# Patient Record
Sex: MALE | Race: WHITE | Employment: FULL TIME | URBAN - METROPOLITAN AREA
[De-identification: names, ages, dates, MRNs, and addresses within clinical notes are randomized per-mention and may not be internally consistent; named-entity substitution may affect disease eponyms.]

---

## 2018-02-12 ENCOUNTER — OFFICE VISIT (OUTPATIENT)
Dept: PEDIATRICS | Facility: CLINIC | Age: 33
End: 2018-02-12
Payer: COMMERCIAL

## 2018-02-12 VITALS
DIASTOLIC BLOOD PRESSURE: 66 MMHG | WEIGHT: 177.5 LBS | HEART RATE: 71 BPM | BODY MASS INDEX: 26.29 KG/M2 | TEMPERATURE: 97.9 F | OXYGEN SATURATION: 96 % | SYSTOLIC BLOOD PRESSURE: 116 MMHG | HEIGHT: 69 IN

## 2018-02-12 DIAGNOSIS — R10.84 ABDOMINAL PAIN, GENERALIZED: ICD-10-CM

## 2018-02-12 DIAGNOSIS — E73.9 LACTOSE INTOLERANCE IN ADULT: Primary | ICD-10-CM

## 2018-02-12 LAB
ALBUMIN SERPL-MCNC: 3.6 G/DL (ref 3.4–5)
ALP SERPL-CCNC: 71 U/L (ref 40–150)
ALT SERPL W P-5'-P-CCNC: 41 U/L (ref 0–70)
ANION GAP SERPL CALCULATED.3IONS-SCNC: 5 MMOL/L (ref 3–14)
AST SERPL W P-5'-P-CCNC: 22 U/L (ref 0–45)
BASOPHILS # BLD AUTO: 0.1 10E9/L (ref 0–0.2)
BASOPHILS NFR BLD AUTO: 0.9 %
BILIRUB SERPL-MCNC: 0.9 MG/DL (ref 0.2–1.3)
BUN SERPL-MCNC: 29 MG/DL (ref 7–30)
CALCIUM SERPL-MCNC: 9 MG/DL (ref 8.5–10.1)
CHLORIDE SERPL-SCNC: 106 MMOL/L (ref 94–109)
CO2 SERPL-SCNC: 31 MMOL/L (ref 20–32)
CREAT SERPL-MCNC: 1.28 MG/DL (ref 0.66–1.25)
DIFFERENTIAL METHOD BLD: NORMAL
EOSINOPHIL # BLD AUTO: 0.2 10E9/L (ref 0–0.7)
EOSINOPHIL NFR BLD AUTO: 3.7 %
ERYTHROCYTE [DISTWIDTH] IN BLOOD BY AUTOMATED COUNT: 13.1 % (ref 10–15)
GFR SERPL CREATININE-BSD FRML MDRD: 64 ML/MIN/1.7M2
GLUCOSE SERPL-MCNC: 74 MG/DL (ref 70–99)
HCT VFR BLD AUTO: 45.5 % (ref 40–53)
HGB BLD-MCNC: 15.2 G/DL (ref 13.3–17.7)
IMM GRANULOCYTES # BLD: 0 10E9/L (ref 0–0.4)
IMM GRANULOCYTES NFR BLD: 0.7 %
LYMPHOCYTES # BLD AUTO: 1.3 10E9/L (ref 0.8–5.3)
LYMPHOCYTES NFR BLD AUTO: 23.8 %
MCH RBC QN AUTO: 28.8 PG (ref 26.5–33)
MCHC RBC AUTO-ENTMCNC: 33.4 G/DL (ref 31.5–36.5)
MCV RBC AUTO: 86 FL (ref 78–100)
MONOCYTES # BLD AUTO: 0.6 10E9/L (ref 0–1.3)
MONOCYTES NFR BLD AUTO: 10.3 %
NEUTROPHILS # BLD AUTO: 3.4 10E9/L (ref 1.6–8.3)
NEUTROPHILS NFR BLD AUTO: 60.6 %
PLATELET # BLD AUTO: 247 10E9/L (ref 150–450)
POTASSIUM SERPL-SCNC: 3.9 MMOL/L (ref 3.4–5.3)
PROT SERPL-MCNC: 6.6 G/DL (ref 6.8–8.8)
RBC # BLD AUTO: 5.27 10E12/L (ref 4.4–5.9)
SODIUM SERPL-SCNC: 142 MMOL/L (ref 133–144)
WBC # BLD AUTO: 5.6 10E9/L (ref 4–11)

## 2018-02-12 PROCEDURE — 80053 COMPREHEN METABOLIC PANEL: CPT | Performed by: FAMILY MEDICINE

## 2018-02-12 PROCEDURE — 36415 COLL VENOUS BLD VENIPUNCTURE: CPT | Performed by: FAMILY MEDICINE

## 2018-02-12 PROCEDURE — 83516 IMMUNOASSAY NONANTIBODY: CPT | Performed by: FAMILY MEDICINE

## 2018-02-12 PROCEDURE — 83516 IMMUNOASSAY NONANTIBODY: CPT | Mod: 59 | Performed by: FAMILY MEDICINE

## 2018-02-12 PROCEDURE — 99203 OFFICE O/P NEW LOW 30 MIN: CPT | Performed by: FAMILY MEDICINE

## 2018-02-12 PROCEDURE — 86003 ALLG SPEC IGE CRUDE XTRC EA: CPT | Mod: 59 | Performed by: FAMILY MEDICINE

## 2018-02-12 PROCEDURE — 85025 COMPLETE CBC W/AUTO DIFF WBC: CPT | Performed by: FAMILY MEDICINE

## 2018-02-12 PROCEDURE — 86008 ALLG SPEC IGE RECOMB EA: CPT | Performed by: FAMILY MEDICINE

## 2018-02-12 RX ORDER — AMLODIPINE BESYLATE 5 MG/1
5 TABLET ORAL DAILY
COMMUNITY
Start: 2017-10-04

## 2018-02-12 RX ORDER — LISINOPRIL/HYDROCHLOROTHIAZIDE 10-12.5 MG
1 TABLET ORAL DAILY
COMMUNITY
Start: 2017-10-04 | End: 2018-02-12

## 2018-02-12 ASSESSMENT — PAIN SCALES - GENERAL: PAINLEVEL: MILD PAIN (3)

## 2018-02-12 NOTE — MR AVS SNAPSHOT
"              After Visit Summary   2/12/2018    Adrian Garcia    MRN: [id number]           Patient Information     Date Of Birth          1985        Visit Information        Provider Department      2/12/2018 8:10 AM Wolf Encinas MD [facility]        Today's Diagnoses     Lactose intolerance in adult    -  1    Abdominal pain, generalized          Care Instructions    Get the labs today    Lactose Intolerance    Lactose is a simple sugar found in milk and dairy products. Lactose is found in dairy products such as milk, cheese, cottage cheese, cream, sour cream, ice cream, sherbet, milk solids, powdered milk, and whey.  Lactose is digested with the help of an enzyme the body makes called  lactase.\" People with lactose intolerance cannot digest dairy products. This is because their bodies do not make enough lactase. Undigested lactose in the gut cannot be absorbed. This can cause nausea, cramping, bloating, gas, diarrhea, and foul-smelling stools. These symptoms occur within 30 minutes to 2 hours after eating. Symptoms may be mild or severe.  Babies are born with the lactose enzyme, which allows them to absorb breast milk. However, lactose intolerance may appear in children as early as 2 to 5 years old. Even if you have been able to eat dairy products all your life, your body may lose the ability to make the lactose enzyme as you get older. Asians,  Americans, Hispanics, and American Indians are prone to get this problem earlier in life.  Home care  The following guidelines will help you care for yourself at home:    Your body doesn t need dairy products to be healthy. So, if your symptoms are severe, it is best to stop eating dairy products that contain lactose.    If your symptoms are milder, you can probably do well consuming smaller amounts of dairy as long as you combine it with nondairy foods. Yogurt with live cultures may be OK because it contains enzymes that digest " lactose. Butter, margarine, hard and aged cheeses (cheddar, Swiss) contain less lactose and may be OK to eat. You will have to experiment to learn how much dairy you can tolerate without getting symptoms. It may help to keep a food diary.    There are many lactose-free dairy products including milk, ice cream, and cheeses that allow you to still enjoy dairy products. You may also take a lactase enzyme as a supplement that will help you digest dairy products.    We all need calcium and vitamin D in our diet for healthy bones. If you reduce or eliminate dairy from your diet, you need to replace it with other food sources that contain these nutrients such as kale, broccoli, white beans, green beans, lima beans, salmon, soy beans, tofu, or fortified juices. Or, you can take daily supplements.    Learn to read food labels. Also, watch for prepared foods that are made with products that contain lactose (such as bread, cereal, lunch meats, salad dressings, cake and cookie mix, and coffee creamers). However, many people can consume small amounts of lactose without symptoms, so an overly restrictive diet is often not needed.    Lactase enzyme supplements can be obtained over-the-counter. They can help control symptoms if you take the enzymes when you eat lactose.  Other products besides milk and milk products may contain lactose. They may be less obvious, so check the labels carefully. These things may not bother you, but should be considered if you continue to have problems:    Bread and other baked goods    Waffles, pancakes, biscuits, cookies, and mixes to make them    Processed breakfast foods such as doughnuts, frozen waffles and pancakes, toaster pastries, and sweet rolls    Processed breakfast cereals    Instant potatoes, soups, and breakfast drinks    Potato chips, corn chips, and other processed snacks    Processed meats like kaur, sausage, hot dogs, and lunch meats    Margarine    Salad dressings    Liquid and  powdered milk-based meal replacements    Protein powders and bars    Candies    Nondairy liquid and powdered coffee creamers    Nondairy whipped toppings  Follow-up care  Follow up with your healthcare provider, or as advised.  When to seek medical advice  Call your healthcare provider right away if any of these occur:    Increasing abdominal pain or swelling    Abdominal pain that moves to the right side of the lower abdomen    Fever of 100.4 F (38 C) or higher, or as directed by your healthcare provider    Repeated vomiting or diarrhea    Blood in the stool or black and tarry stool (depending on the amount of blood, consider calling 911 for emergency assistance)  Date Last Reviewed: 10/1/2016    3884-6247 [employer]. [address] [place] [address], [place] [address]. All rights reserved. This information is not intended as a substitute for professional medical care. Always follow your healthcare professional's instructions.        Tips for Lactose Intolerance    If you are lactose intolerant, you have trouble digesting lactose. Lactose is a sugar found in milk and other dairy products. Many people are lactose intolerant. Undigested lactose won t hurt you, but it can cause unpleasant symptoms. The good news is that you can get relief. To help reduce symptoms, look for ways to limit the amount of lactose you eat. You can also take a lactase supplement before you eat dairy products.  Finding your limit  People with lactose intolerance may think they can t eat or drink any dairy products. This is often not true. Many people with lactose intolerance can eat or drink small amounts of dairy products without symptoms. To find your own limit, keep track of what you eat and drink. Write down when you have symptoms. Learn how much and what kinds of dairy products you can handle.  Tips to reduce symptoms    Choose low-lactose or lactose-free dairy products. These are widely available. They include products like milk,  yogurt, cheese, and ice cream, among others.    Eat foods with active cultures, such as yogurt. Active cultures make lactose easier to digest.    Eat or drink dairy products with other foods to lessen symptoms.    Use fruit juice to replace some or all of the milk in recipes.    Take lactase enzyme tablets with dairy products to help prevent symptoms.    Avoid eating many high-lactose foods (such as milk, butter, and ice cream) at one time.  Eat other calcium-rich foods  If you eat less dairy, you may be getting less calcium. Ask your doctor about calcium supplements. Also, eat more dairy-free, calcium-rich foods, such as:    Broccoli, lettuce greens, kale, bok ned (Chinese cabbage), turnip greens    Fish with edible bones (canned salmon or sardines)    Alfalfa sprouts, soy sprouts    Tofu, soybeans, morton beans, navy beans    Almonds, sesame seeds    Calcium-fortified orange juice, soy drink, rice drink    Oranges  Be aware that the calcium from these foods varies. It may not be as well absorbed by the body as calcium from dairy products.   Try nondairy substitutes  Dairy Substitute   Milk, cream Soy drink, rice drink, nondairy creamer   Cheese Tofu (soy) cheese, some aged cheeses   Butter, margarine Milk-free margarine, vegetable oil   Ice cream Fruit sorbet, juice bars      Your body needs vitamin D to use calcium. You can get vitamin D by eating foods that have vitamin D. These include salmon, tuna, and eggs. Also, talk with your provider about taking a vitamin D supplement. Your vitamin D levels can be checked and followed by a blood test to be sure you are not lacking this nutrient.  Removing all dairy items from your diet is not often needed. And removing dairy also means taking out other healthy foods from your diet. This is why lactose-free dairy products are often a good choice. Your provider can also talk with you about taking calcium and vitamin D supplements.  Date Last Reviewed: 7/1/2016 2000-2017  [employer]. [address] [place] [address]. All rights reserved. This information is not intended as a substitute for professional medical care. Always follow your healthcare professional's instructions.                Follow-ups after your visit        Who to contact     If you have questions or need follow up information about today's clinic visit or your schedule please contact [facility] directly at [phone].  Normal or non-critical lab and imaging results will be communicated to you by [employer], letter or phone within 4 business days after the clinic has received the results. If you do not hear from us within 7 days, please contact the clinic through [employer] or phone. If you have a critical or abnormal lab result, we will notify you by phone as soon as possible.  Submit refill requests through [employer] or call your pharmacy and they will forward the refill request to us. Please allow 3 business days for your refill to be completed.          Additional Information About Your Visit        [employer] Information     [employer] is an electronic gateway that provides easy, online access to your medical records. With [employer], you can request a clinic appointment, read your test results, renew a prescription or communicate with your care team.     To sign up for [employer] visit the website at [email or web address]   You will be asked to enter the access code listed below, as well as some personal information. Please follow the directions to create your username and password.     Your access code is: [email or web address]  Expires: 2018  8:44 AM     Your access code will  in 90 days. If you need help or a new code, please contact your [facility] or call [phone] for assistance.        Care EveryWhere ID     This is your Care EveryWhere ID. This could be used by other organizations to access your [place] medical records  [id number]       "  Your Vitals Were     Pulse Temperature Height Pulse Oximetry BMI (Body Mass Index)       71 97.9  F (36.6  C) (Oral) 5' 8.5\" (1.74 m) 96% 26.6 kg/m2        Blood Pressure from Last 3 Encounters:   02/12/18 116/66   02/05/15 106/68   12/16/14 122/78    Weight from Last 3 Encounters:   02/12/18 177 lb 8 oz (80.5 kg)   02/05/15 177 lb (80.3 kg)   12/16/14 179 lb 9.6 oz (81.5 kg)              We Performed the Following     Allergy adult food panel     CBC with platelets and differential     Comprehensive metabolic panel     Milk Components Allergy Panel     Tissue transglutaminase arun IgA and IgG        Primary Care Provider Fax #    Physician No Ref-Primary [phone]       No address on file        Equal Access to Services     VERONICA VELAZQUEZ : Tan Ibrahim, wajuan luqjemima, jaz kaalmada reji, shivani felix . So [facility] [phone].    ATENCIÓN: Si habla español, tiene a pemberton disposición servicios gratuitos de asistencia lingüística. Alex al [phone].    We comply with applicable federal civil rights laws and [place] laws. We do not discriminate on the basis of race, color, national origin, age, disability, sex, sexual orientation, or gender identity.            Thank you!     Thank you for choosing [facility]  for your care. Our goal is always to provide you with excellent care. Hearing back from our patients is one way we can continue to improve our services. Please take a few minutes to complete the written survey that you may receive in the mail after your visit with us. Thank you!             Your Updated Medication List - Protect others around you: Learn how to safely use, store and throw away your medicines at www.disposemymeds.org.          This list is accurate as of 2/12/18  8:44 AM.  Always use your most recent med list.                   Brand Name Dispense Instructions for use Diagnosis    amLODIPine 5 MG tablet    NORVASC     Take " 5 mg by mouth daily    Lactose intolerance in adult, Abdominal pain, generalized       lisinopril-hydrochlorothiazide 10-12.5 MG per tablet    PRINZIDE/ZESTORETIC     Take 1 tablet by mouth daily    Lactose intolerance in adult, Abdominal pain, generalized

## 2018-02-12 NOTE — PATIENT INSTRUCTIONS
"Get the labs today    Lactose Intolerance    Lactose is a simple sugar found in milk and dairy products. Lactose is found in dairy products such as milk, cheese, cottage cheese, cream, sour cream, ice cream, sherbet, milk solids, powdered milk, and whey.  Lactose is digested with the help of an enzyme the body makes called  lactase.\" People with lactose intolerance cannot digest dairy products. This is because their bodies do not make enough lactase. Undigested lactose in the gut cannot be absorbed. This can cause nausea, cramping, bloating, gas, diarrhea, and foul-smelling stools. These symptoms occur within 30 minutes to 2 hours after eating. Symptoms may be mild or severe.  Babies are born with the lactose enzyme, which allows them to absorb breast milk. However, lactose intolerance may appear in children as early as 2 to 5 years old. Even if you have been able to eat dairy products all your life, your body may lose the ability to make the lactose enzyme as you get older. Asians,  Americans, Hispanics, and American Indians are prone to get this problem earlier in life.  Home care  The following guidelines will help you care for yourself at home:    Your body doesn t need dairy products to be healthy. So, if your symptoms are severe, it is best to stop eating dairy products that contain lactose.    If your symptoms are milder, you can probably do well consuming smaller amounts of dairy as long as you combine it with nondairy foods. Yogurt with live cultures may be OK because it contains enzymes that digest lactose. Butter, margarine, hard and aged cheeses (cheddar, Swiss) contain less lactose and may be OK to eat. You will have to experiment to learn how much dairy you can tolerate without getting symptoms. It may help to keep a food diary.    There are many lactose-free dairy products including milk, ice cream, and cheeses that allow you to still enjoy dairy products. You may also take a lactase enzyme as " a supplement that will help you digest dairy products.    We all need calcium and vitamin D in our diet for healthy bones. If you reduce or eliminate dairy from your diet, you need to replace it with other food sources that contain these nutrients such as kale, broccoli, white beans, green beans, lima beans, salmon, soy beans, tofu, or fortified juices. Or, you can take daily supplements.    Learn to read food labels. Also, watch for prepared foods that are made with products that contain lactose (such as bread, cereal, lunch meats, salad dressings, cake and cookie mix, and coffee creamers). However, many people can consume small amounts of lactose without symptoms, so an overly restrictive diet is often not needed.    Lactase enzyme supplements can be obtained over-the-counter. They can help control symptoms if you take the enzymes when you eat lactose.  Other products besides milk and milk products may contain lactose. They may be less obvious, so check the labels carefully. These things may not bother you, but should be considered if you continue to have problems:    Bread and other baked goods    Waffles, pancakes, biscuits, cookies, and mixes to make them    Processed breakfast foods such as doughnuts, frozen waffles and pancakes, toaster pastries, and sweet rolls    Processed breakfast cereals    Instant potatoes, soups, and breakfast drinks    Potato chips, corn chips, and other processed snacks    Processed meats like kaur, sausage, hot dogs, and lunch meats    Margarine    Salad dressings    Liquid and powdered milk-based meal replacements    Protein powders and bars    Candies    Nondairy liquid and powdered coffee creamers    Nondairy whipped toppings  Follow-up care  Follow up with your healthcare provider, or as advised.  When to seek medical advice  Call your healthcare provider right away if any of these occur:    Increasing abdominal pain or swelling    Abdominal pain that moves to the right side of  the lower abdomen    Fever of 100.4 F (38 C) or higher, or as directed by your healthcare provider    Repeated vomiting or diarrhea    Blood in the stool or black and tarry stool (depending on the amount of blood, consider calling 911 for emergency assistance)  Date Last Reviewed: 10/1/2016    5509-0177 [employer]. [address] [place] [address], [place] [address]. All rights reserved. This information is not intended as a substitute for professional medical care. Always follow your healthcare professional's instructions.        Tips for Lactose Intolerance    If you are lactose intolerant, you have trouble digesting lactose. Lactose is a sugar found in milk and other dairy products. Many people are lactose intolerant. Undigested lactose won t hurt you, but it can cause unpleasant symptoms. The good news is that you can get relief. To help reduce symptoms, look for ways to limit the amount of lactose you eat. You can also take a lactase supplement before you eat dairy products.  Finding your limit  People with lactose intolerance may think they can t eat or drink any dairy products. This is often not true. Many people with lactose intolerance can eat or drink small amounts of dairy products without symptoms. To find your own limit, keep track of what you eat and drink. Write down when you have symptoms. Learn how much and what kinds of dairy products you can handle.  Tips to reduce symptoms    Choose low-lactose or lactose-free dairy products. These are widely available. They include products like milk, yogurt, cheese, and ice cream, among others.    Eat foods with active cultures, such as yogurt. Active cultures make lactose easier to digest.    Eat or drink dairy products with other foods to lessen symptoms.    Use fruit juice to replace some or all of the milk in recipes.    Take lactase enzyme tablets with dairy products to help prevent symptoms.    Avoid eating many high-lactose foods (such as milk,  butter, and ice cream) at one time.  Eat other calcium-rich foods  If you eat less dairy, you may be getting less calcium. Ask your doctor about calcium supplements. Also, eat more dairy-free, calcium-rich foods, such as:    Broccoli, lettuce greens, kale, bok ned (Chinese cabbage), turnip greens    Fish with edible bones (canned salmon or sardines)    Alfalfa sprouts, soy sprouts    Tofu, soybeans, morton beans, navy beans    Almonds, sesame seeds    Calcium-fortified orange juice, soy drink, rice drink    Oranges  Be aware that the calcium from these foods varies. It may not be as well absorbed by the body as calcium from dairy products.   Try nondairy substitutes  Dairy Substitute   Milk, cream Soy drink, rice drink, nondairy creamer   Cheese Tofu (soy) cheese, some aged cheeses   Butter, margarine Milk-free margarine, vegetable oil   Ice cream Fruit sorbet, juice bars      Your body needs vitamin D to use calcium. You can get vitamin D by eating foods that have vitamin D. These include salmon, tuna, and eggs. Also, talk with your provider about taking a vitamin D supplement. Your vitamin D levels can be checked and followed by a blood test to be sure you are not lacking this nutrient.  Removing all dairy items from your diet is not often needed. And removing dairy also means taking out other healthy foods from your diet. This is why lactose-free dairy products are often a good choice. Your provider can also talk with you about taking calcium and vitamin D supplements.  Date Last Reviewed: 7/1/2016 2000-2017 [employer]. [address] [place] [address], [place] [address]. All rights reserved. This information is not intended as a substitute for professional medical care. Always follow your healthcare professional's instructions.

## 2018-02-12 NOTE — PROGRESS NOTES
SUBJECTIVE:   Adrian Garcia is a 32 year old male who presents to clinic today for the following health issues:        This is a new patient to this provider, is here today with concerns of having abdominal bloating, discomfort that starts right after he eats milk or milk products-for the past 3-4 months.    Patient denies having lactose intolerance in the past.    He denies concerns for current constipation, diarrhea, UTI symptoms.  Patient had diarrhea when he had lactose intolerance.    The last time he had any milk product was 3 days ago.  Patient denies personal or family history of gluten intolerance.  He is planning for [employer] trip with his family and wants to have this checked out before he leaves since he is worried about having milk products and almost every food that he may be eating there.  Patient denies alcohol use, previous liver or gallbladder problems.    ABDOMINAL PAIN     Onset: 3-4 months    Description:   Character: Fullness and pressure  Location: lower abdomen - both sides  Radiation: None    Intensity: moderate    Progression of Symptoms:  same    Accompanying Signs & Symptoms:  Fever/Chills?: no   Gas/Bloating: YES  Nausea: no   Vomitting: no   Diarrhea?: YES  Constipation:no   Dysuria or Hematuria: no    History:   Trauma: no   Previous similar pain: YES- similar symptoms in early 20's for a few years and went away on it's own   Previous tests done: none    Precipitating factors:   Does the pain change with:     Food: YES- only has symptoms after eating dairy products     BM: no     Urination: no     Alleviating factors:  Avoiding dairy products    Therapies Tried and outcome: Lactaid-not effective    LMP:  not applicable           Problem list and histories reviewed & adjusted, as indicated.  Additional history: as documented    Patient Active Problem List   Diagnosis     Memory loss     Dizziness     Benign essential hypertension     Sinusitis     CARDIOVASCULAR SCREENING; LDL  "GOAL LESS THAN 160     History reviewed. No pertinent surgical history.    Social History   Substance Use Topics     Smoking status: Never Smoker     Smokeless tobacco: Never Used     Alcohol use No     History reviewed. No pertinent family history.      Current Outpatient Prescriptions   Medication Sig Dispense Refill     lisinopril-hydrochlorothiazide (PRINZIDE/ZESTORETIC) 10-12.5 MG per tablet Take 1 tablet by mouth daily       amLODIPine (NORVASC) 5 MG tablet Take 5 mg by mouth daily       [DISCONTINUED] AmLODIPine Besylate (NORVASC PO) Take 1 tablet by mouth daily       Allergies   Allergen Reactions     Codeine      Codeine Sulfate Nausea and Vomiting     Recent Labs   Lab Test  02/12/18   0850   ALT  41   CR  1.28*   GFRESTIMATED  64   GFRESTBLACK  78   POTASSIUM  3.9      BP Readings from Last 3 Encounters:   02/12/18 116/66   02/05/15 106/68   12/16/14 122/78    Wt Readings from Last 3 Encounters:   02/12/18 177 lb 8 oz (80.5 kg)   02/05/15 177 lb (80.3 kg)   12/16/14 179 lb 9.6 oz (81.5 kg)                  Labs reviewed in EPIC    Reviewed and updated as needed this visit by clinical staff  Tobacco  Allergies  Meds  Med Hx  Surg Hx  Fam Hx  Soc Hx      Reviewed and updated as needed this visit by Provider         ROS:  C: NEGATIVE for fever, chills, change in weight  R: NEGATIVE for significant cough or SOB  CV: NEGATIVE for chest pain, palpitations or peripheral edema  GI: as above  : negative for dysuria, hematuria, decreased urinary stream, erectile dysfunction  PSYCHIATRIC: NEGATIVE for changes in mood or affect    OBJECTIVE:     /66 (BP Location: Right arm, Patient Position: Sitting, Cuff Size: Adult Regular)  Pulse 71  Temp 97.9  F (36.6  C) (Oral)  Ht 5' 8.5\" (1.74 m)  Wt 177 lb 8 oz (80.5 kg)  SpO2 96%  BMI 26.6 kg/m2  Body mass index is 26.6 kg/(m^2).  GENERAL: healthy, alert and no distress  RESP: lungs clear to auscultation - no rales, rhonchi or wheezes  CV: regular rate " and rhythm, normal S1 S2, no S3 or S4, no murmur, click or rub, no peripheral edema and peripheral pulses strong  ABDOMEN: soft, non tender, no guarding or rigidity, no organomegaly, normal BS, no costovertebral angle tenderness  PSYCH: mentation appears normal, affect normal/bright    Diagnostic Test Results:  none     ASSESSMENT/PLAN:             1. Abdominal pain, generalized  ddx given the history-lactose intolerance/-other food allergies/constipation  Recommended to have labs done for further evaluation.  Will f/u on results and call with recommendations.  If lab tests are negative, consider treating constipation with over-the-counter MiraLAX presumptively.  Continue to avoid triggering foods    Patient verbalised understanding and is agreeable to the plan.      - CBC with platelets and differential  - Comprehensive metabolic panel  - Allergy adult food panel  - Tissue transglutaminase arun IgA and IgG  - Milk Components Allergy Panel    2. Lactose intolerance in adult  as above    - CBC with platelets and differential  - Comprehensive metabolic panel  - Allergy adult food panel  - Tissue transglutaminase arun IgA and IgG  - Milk Components Allergy Panel    See Patient Instructions.  Chart documentation done in part with Dragon Voice recognition Software. Although reviewed after completion, some word and grammatical error may remain.        Wolf Encinas MD  [facility]

## 2018-02-12 NOTE — NURSING NOTE
"Chief Complaint   Patient presents with     Abdominal Pain       Initial /66 (BP Location: Right arm, Patient Position: Sitting, Cuff Size: Adult Regular)  Pulse 71  Temp 97.9  F (36.6  C) (Oral)  Ht 5' 8.5\" (1.74 m)  Wt 177 lb 8 oz (80.5 kg)  SpO2 96%  BMI 26.6 kg/m2 Estimated body mass index is 26.6 kg/(m^2) as calculated from the following:    Height as of this encounter: 5' 8.5\" (1.74 m).    Weight as of this encounter: 177 lb 8 oz (80.5 kg).  Medication Reconciliation: complete  Asia Motley, TENISHA    "

## 2018-02-14 ENCOUNTER — TELEPHONE (OUTPATIENT)
Dept: PEDIATRICS | Facility: CLINIC | Age: 33
End: 2018-02-14

## 2018-02-14 PROBLEM — R79.89 ELEVATED SERUM CREATININE: Status: ACTIVE | Noted: 2018-02-14

## 2018-02-14 LAB
A-LACTALB IGE QN: <0.1 KU(A)/L
B-LACTOGLOB MF77 IGE QN: <0.1 KU(A)/L
CASEIN IGE QN: <0.1 KU(A)/L
CLAM IGE QN: <0.1 KU(A)/L
CODFISH IGE QN: <0.1 KU(A)/L
CORN IGE QN: <0.1 KU(A)/L
COW MILK IGE QN: <0.1 KU/L
EGG WHITE IGE QN: <0.1 KU(A)/L
PEANUT IGE QN: <0.1 KU(A)/L
SCALLOP IGE QN: <0.1 KU(A)/L
SHRIMP IGE QN: <0.1 KU(A)/L
SOYBEAN IGE QN: <0.1 KU(A)/L
TTG IGA SER-ACNC: <1 U/ML
TTG IGG SER-ACNC: <1 U/ML
WALNUT IGE QN: <0.1 KU(A)/L
WHEAT IGE QN: <0.1 KU(A)/L

## 2018-02-14 NOTE — TELEPHONE ENCOUNTER
Please inform Adrian of lab results-no food allergies found including milk.  Normal liver functions, very slightly elevated creatinine which could be nonspecific (we will recheck these labs at the time of physical in the next few months.) .    Will give a trial of daily MiraLAX For the next 1-2 weeks and then twice weekly for maintenance.   if this does not resolve the pain in 2 weeks, will follow up for reevaluation.

## 2018-02-15 NOTE — TELEPHONE ENCOUNTER
Attempt #1    Left message for patient to return call to clinic.  Clinic phone number given.    Analilia Mendosa CMA

## 2018-02-15 NOTE — TELEPHONE ENCOUNTER
I advised patient of the information below per Dr. Encinas.  Patient states he has not had any constipation he has had diarrhea so he is wondering why he would need to take miralax?  If for some reason he does need to take miralax how much would he take daily?  I advised patient I will ask Dr. Encinas and get back to him either later today or tomorrow.  Patient stated understanding.    Analilia Mendoas CMA

## 2018-02-16 NOTE — TELEPHONE ENCOUNTER
Called and left message for clarification-His symptoms could be related to IBS  And can take MEATMUCIL powder daily for diarrhea and IBS.  Can try MIRALAX only for constipation prn.  Adrian can call us for questions or concerns and will f/u in 4-6 weeks  for persistent or worsening concerns  .  Please review this message if he calls back.

## 2018-03-19 ENCOUNTER — THERAPY VISIT (OUTPATIENT)
Dept: PHYSICAL THERAPY | Facility: CLINIC | Age: 33
End: 2018-03-19
Payer: COMMERCIAL

## 2018-03-19 DIAGNOSIS — M25.561 RIGHT KNEE PAIN: ICD-10-CM

## 2018-03-19 DIAGNOSIS — M25.562 LEFT KNEE PAIN: Primary | ICD-10-CM

## 2018-03-19 PROCEDURE — 97162 PT EVAL MOD COMPLEX 30 MIN: CPT | Mod: GP | Performed by: PHYSICAL THERAPIST

## 2018-03-19 PROCEDURE — 97110 THERAPEUTIC EXERCISES: CPT | Mod: GP | Performed by: PHYSICAL THERAPIST

## 2018-03-19 ASSESSMENT — ACTIVITIES OF DAILY LIVING (ADL)
AS_A_RESULT_OF_YOUR_KNEE_INJURY,_HOW_WOULD_YOU_RATE_YOUR_CURRENT_LEVEL_OF_DAILY_ACTIVITY?: NEARLY NORMAL
GO UP STAIRS: ACTIVITY IS SOMEWHAT DIFFICULT
WALK: ACTIVITY IS NOT DIFFICULT
STAND: ACTIVITY IS NOT DIFFICULT
SWELLING: I DO NOT HAVE THE SYMPTOM
HOW_WOULD_YOU_RATE_THE_CURRENT_FUNCTION_OF_YOUR_KNEE_DURING_YOUR_USUAL_DAILY_ACTIVITIES_ON_A_SCALE_FROM_0_TO_100_WITH_100_BEING_YOUR_LEVEL_OF_KNEE_FUNCTION_PRIOR_TO_YOUR_INJURY_AND_0_BEING_THE_INABILITY_TO_PERFORM_ANY_OF_YOUR_USUAL_DAILY_ACTIVITIES?: 70
SQUAT: ACTIVITY IS FAIRLY DIFFICULT
STIFFNESS: THE SYMPTOM AFFECTS MY ACTIVITY SLIGHTLY
SIT WITH YOUR KNEE BENT: ACTIVITY IS NOT DIFFICULT
KNEEL ON THE FRONT OF YOUR KNEE: ACTIVITY IS SOMEWHAT DIFFICULT
KNEE_ACTIVITY_OF_DAILY_LIVING_SCORE: 78.57
KNEE_ACTIVITY_OF_DAILY_LIVING_SUM: 55
GIVING WAY, BUCKLING OR SHIFTING OF KNEE: I DO NOT HAVE THE SYMPTOM
GO DOWN STAIRS: ACTIVITY IS NOT DIFFICULT
WEAKNESS: THE SYMPTOM AFFECTS MY ACTIVITY MODERATELY
HOW_WOULD_YOU_RATE_THE_OVERALL_FUNCTION_OF_YOUR_KNEE_DURING_YOUR_USUAL_DAILY_ACTIVITIES?: NEARLY NORMAL
LIMPING: I DO NOT HAVE THE SYMPTOM
RISE FROM A CHAIR: ACTIVITY IS NOT DIFFICULT
RAW_SCORE: 55
PAIN: THE SYMPTOM AFFECTS MY ACTIVITY MODERATELY

## 2018-03-19 NOTE — LETTER
[facility]  [address]  [place] [facility] [place] [id number] [phone]    2018    Re: Adrian Garcia   :   1985  MRN:  [id number]   REFERRING PHYSICIAN:   Roger Duarte    [facility]    Date of Initial Evaluation:  2018  Visits:  Rxs Used: 1  Reason for Referral:     Left knee pain  Right knee pain    EVALUATION SUMMARY    [facility]    Subjective:  Patient is a 32 year old male presenting with rehab right knee hpi. The history is provided by the patient. No  was used.   Adrian Garcia is a 32 year old male with a bilateral knees condition. Condition occurred with:  Insidious onset.  Condition occurred: for unknown reasons.  This is a chronic condition  Patient reports that he had insidious onset of B knee pain 1-2 years ago. He started to work out more about 2 months ago and noticed pain and difficulty with deep squats. Md order from 18.    Patient reports pain:  In the joint and anterior. Pain is described as aching and stabbing and is intermittent and reported as 1/10 (up to 5-6/10 when painful).  Associated symptoms:  Loss of motion/stiffness and loss of strength. Pain is the same all the time (pain based more on activity).  Symptoms are exacerbated by other, ascending stairs and kneeling (5/10 pain to squat, 3/10 pain to ascend steps 2 at a time) and relieved by rest.  Since onset symptoms are unchanged. Special tests:  X-ray (normal per patient). General health as reported by patient is good. Pertinent medical history includes:  High blood pressure. Medical allergies: no. Other surgeries include:  None reported. Current medications:  High blood pressure medication. Current occupation is Computer tech.  Patient is working in normal job without restrictions. Primary job tasks include: Prolonged sitting and  other (computer work).  Barriers include:  None as reported by the patient.  Red flags:  None as reported by the patient.    Objective:  Knee deviations alignment: grasshopper eyes B.  Gait Type:  Normal   Assistive Devices:  None  Knee:  Patellar lateral tilt L and patellar lateral tilt R       Hip Evaluation  Extension:  Left: 4/5  -  Pain:Right: 4+/5    Pain:    Abduction:  Left: 4-/5    -   Pain:Right: 4+/5   -   Pain:  Re: Adrian TEODORA Jose   :   1985      External Rotation:  Left: 4/5   -  Pain:  Right: 4+/5   -  Pain:       Knee Evaluation:  ROM:  AROM: normal  PROM: normal    Extension:  Left: 4/5    Pain:+      Right: 4+/5    Pain:+/-  Ligament Testing:  Normal  Left knee positive for the following special tests:  Patellar Compression  Palpation:  Not Assessed  Edema:  Normal    Assessment/Plan:    Patient is a 32 year old male with both sides knee complaints.    Patient has the following significant findings with corresponding treatment plan.                Diagnosis 1:  Knee pain-probable PFP  Pain -  manual therapy, splint/taping/bracing/orthotics, self management, education, directional preference exercise and home program  Decreased strength - therapeutic exercise, therapeutic activities and home program  Impaired balance - neuro re-education, therapeutic activities and home program  Decreased proprioception - neuro re-education, therapeutic activities and home program  Impaired muscle performance - neuro re-education and home program  Decreased function - therapeutic activities and home program    Therapy Evaluation Codes:   1) History comprised of:   Personal factors that impact the plan of care:      Time since onset of symptoms.    Comorbidity factors that impact the plan of care are:      Weakness.     Medications impacting care: None.  2) Examination of Body Systems comprised of:   Body structures and functions that impact the plan of care:      Knee.   Activity limitations that impact the  plan of care are:      Squatting/kneeling, Stairs and kneeling.  3) Clinical presentation characteristics are:   Evolving/Changing.  4) Decision-Making    Moderate complexity using standardized patient assessment instrument and/or measureable assessment of functional outcome.  Cumulative Therapy Evaluation is: Moderate complexity.    Previous and current functional limitations:  (See Goal Flow Sheet for this information)    Short term and Long term goals: (See Goal Flow Sheet for this information)     Communication ability:  Patient appears to be able to clearly communicate and understand verbal and written communication and follow directions correctly.  Treatment Explanation - The following has been discussed with the patient:   RX ordered/plan of care    Re: Adrian TEODORA Jose   :   1985          Anticipated outcomes  Possible risks and side effects  This patient would benefit from PT intervention to resume normal activities.   Rehab potential is good.    Frequency:  1 X week, once daily  Duration:  for 8 weeks  Discharge Plan:  Achieve all LTG.  Independent in home treatment program.  Reach maximal therapeutic benefit.      Thank you for your referral.      INQUIRIES  Therapist:Anne Fung, PT   [facility]ER[facility]  [address] [place] [address]. [address]  [facility] [address]  Phone: [phone]  Fax: [phone]

## 2018-03-19 NOTE — MR AVS SNAPSHOT
"              After Visit Summary   3/19/2018    Adrian Garcia    MRN: [id number]           Patient Information     Date Of Birth          1985        Visit Information        Provider Department      3/19/2018 3:40 PM Anne Fung PT [facility]        Today's Diagnoses     Left knee pain    -  1    Right knee pain           Follow-ups after your visit        Your next 10 appointments already scheduled     Mar 26, 2018  3:40 PM CDT   (Arrive by 3:25 PM)   [facility] Extremity with Anne Fung PT   [facility] ([facility])    [address]  [place] [facility] [address] [phone]              Who to contact     If you have questions or need follow up information about today's clinic visit or your schedule please contact [facility] PHYSICAL [facility] directly at [phone].  Normal or non-critical lab and imaging results will be communicated to you by [employer] Inchart, letter or phone within 4 business days after the clinic has received the results. If you do not hear from us within 7 days, please contact the clinic through Vixely Inchart or phone. If you have a critical or abnormal lab result, we will notify you by phone as soon as possible.  Submit refill requests through [employer] or call your pharmacy and they will forward the refill request to us. Please allow 3 business days for your refill to be completed.          Additional Information About Your Visit        [employer] Information     [employer] lets you send messages to your doctor, view your test results, renew your prescriptions, schedule appointments and more. To sign up, go to [email or web address] . Click on \"Log in\" on the left side of the screen, which will take you to the Welcome page. Then click on \"Sign up Now\" on the right side of the page.     You will be asked to enter the access code listed below, as " well as some personal information. Please follow the directions to create your username and password.     Your access code is: [email or web address]  Expires: 2018  9:44 AM     Your access code will  in 90 days. If you need help or a new code, please call your [facility] clinic or [phone].        Care EveryWhere ID     This is your Care EveryWhere ID. This could be used by other organizations to access your [facility] medical records  [id number]         Blood Pressure from Last 3 Encounters:   18 116/66   02/05/15 106/68   14 122/78    Weight from Last 3 Encounters:   18 80.5 kg (177 lb 8 oz)   02/05/15 80.3 kg (177 lb)   14 81.5 kg (179 lb 9.6 oz)              We Performed the Following     BELGICA Inital Eval Report     PT Eval, Moderate Complexity (75149)     Therapeutic Exercises        Primary Care Provider Fax #    Physician No Ref-Primary [phone]       No address on file        Equal Access to Services     BERNIE [facility]: Hadii aad ku hadasho Soomaali, waaxda luqadaha, qaybta kaalmada adeegyasylvain, shivani felix . So [facility] [phone].    ATENCIÓN: Si habla español, tiene a pemberton disposición servicios gratuitos de asistencia lingüística. Llame al [phone].    We comply with applicable federal civil rights laws and [place] laws. We do not discriminate on the basis of race, color, national origin, age, disability, sex, sexual orientation, or gender identity.            Thank you!     Thank you for choosing [facility]ICAL THER[facility]  for your care. Our goal is always to provide you with excellent care. Hearing back from our patients is one way we can continue to improve our services. Please take a few minutes to complete the written survey that you may receive in the mail after your visit with us. Thank you!             Your Updated Medication List - Protect others around you: Learn how to safely use, store and throw away  your medicines at www.disposemymeds.org.          This list is accurate as of 3/19/18  5:10 PM.  Always use your most recent med list.                   Brand Name Dispense Instructions for use Diagnosis    amLODIPine 5 MG tablet    NORVASC     Take 5 mg by mouth daily

## 2018-03-19 NOTE — PROGRESS NOTES
[facility] Initial Evaluation  Subjective:  Patient is a 32 year old male presenting with rehab right knee hpi. The history is provided by the patient. No  was used.   Adrian Garcia is a 32 year old male with a bilateral knees condition.  Condition occurred with:  Insidious onset.  Condition occurred: for unknown reasons.  This is a chronic condition  Patient reports that he had insidious onset of B knee pain 1-2 years ago. He started to work out more about 2 months ago and noticed pain and difficulty with deep squats. Md order from 2-26-18..    Patient reports pain:  In the joint and anterior.    Pain is described as aching and stabbing and is intermittent and reported as 1/10 (up to 5-6/10 when painful).  Associated symptoms:  Loss of motion/stiffness and loss of strength. Pain is the same all the time (pain based more on activity).  Symptoms are exacerbated by other, ascending stairs and kneeling (5/10 pain to squat, 3/10 pain to ascend steps 2 at a time) and relieved by rest.  Since onset symptoms are unchanged.  Special tests:  X-ray (normal per patient).      General health as reported by patient is good.  Pertinent medical history includes:  High blood pressure.  Medical allergies: no.  Other surgeries include:  None reported.  Current medications:  High blood pressure medication.  Current occupation is .  Patient is working in normal job without restrictions.  Primary job tasks include:  Prolonged sitting and other (computer work).    Barriers include:  None as reported by the patient.    Red flags:  None as reported by the patient.                        Objective:  Standing Alignment:              Knee deviations alignment: grasshopper eyes B.      Gait:    Gait Type:  Normal   Assistive Devices:  None    Non-Weight Bearing:        Knee:  Patellar lateral tilt L and patellar lateral tilt R                                                 Hip  Evaluation    Hip Strength:      Extension:  Left: 4/5  -  Pain:Right: 4+/5    Pain:    Abduction:  Left: 4-/5    -   Pain:Right: 4+/5   -   Pain:      External Rotation:  Left: 4/5   -  Pain:  Right: 4+/5   -  Pain:                     Knee Evaluation:  ROM:  AROM: normal  PROM: normal            Strength:     Extension:  Left: 4/5    Pain:+      Right: 4+/5    Pain:+/-        Ligament Testing:  Normal                Special Tests:   Left knee positive for the following special tests:  Patellar Compression    Palpation:  Not Assessed      Edema:  Normal            General     ROS    Assessment/Plan:    Patient is a 32 year old male with both sides knee complaints.    Patient has the following significant findings with corresponding treatment plan.                Diagnosis 1:  Knee pain-probable PFP  Pain -  manual therapy, splint/taping/bracing/orthotics, self management, education, directional preference exercise and home program  Decreased strength - therapeutic exercise, therapeutic activities and home program  Impaired balance - neuro re-education, therapeutic activities and home program  Decreased proprioception - neuro re-education, therapeutic activities and home program  Impaired muscle performance - neuro re-education and home program  Decreased function - therapeutic activities and home program    Therapy Evaluation Codes:   1) History comprised of:   Personal factors that impact the plan of care:      Time since onset of symptoms.    Comorbidity factors that impact the plan of care are:      Weakness.     Medications impacting care: None.  2) Examination of Body Systems comprised of:   Body structures and functions that impact the plan of care:      Knee.   Activity limitations that impact the plan of care are:      Squatting/kneeling, Stairs and kneeling.  3) Clinical presentation characteristics are:   Evolving/Changing.  4) Decision-Making    Moderate complexity using standardized patient assessment  instrument and/or measureable assessment of functional outcome.  Cumulative Therapy Evaluation is: Moderate complexity.    Previous and current functional limitations:  (See Goal Flow Sheet for this information)    Short term and Long term goals: (See Goal Flow Sheet for this information)     Communication ability:  Patient appears to be able to clearly communicate and understand verbal and written communication and follow directions correctly.  Treatment Explanation - The following has been discussed with the patient:   RX ordered/plan of care  Anticipated outcomes  Possible risks and side effects  This patient would benefit from PT intervention to resume normal activities.   Rehab potential is good.    Frequency:  1 X week, once daily  Duration:  for 8 weeks  Discharge Plan:  Achieve all LTG.  Independent in home treatment program.  Reach maximal therapeutic benefit.    Please refer to the daily flowsheet for treatment today, total treatment time and time spent performing 1:1 timed codes.

## 2018-04-10 ENCOUNTER — THERAPY VISIT (OUTPATIENT)
Dept: PHYSICAL THERAPY | Facility: CLINIC | Age: 33
End: 2018-04-10
Payer: COMMERCIAL

## 2018-04-10 DIAGNOSIS — M25.561 RIGHT KNEE PAIN: ICD-10-CM

## 2018-04-10 DIAGNOSIS — M25.562 LEFT KNEE PAIN: ICD-10-CM

## 2018-04-10 PROCEDURE — 97530 THERAPEUTIC ACTIVITIES: CPT | Mod: GP | Performed by: PHYSICAL THERAPIST

## 2018-04-10 PROCEDURE — 97110 THERAPEUTIC EXERCISES: CPT | Mod: GP | Performed by: PHYSICAL THERAPIST

## 2018-04-10 NOTE — MR AVS SNAPSHOT
"              After Visit Summary   4/10/2018    Adrian Garcia    MRN: [id number]           Patient Information     Date Of Birth          1985        Visit Information        Provider Department      4/10/2018 3:40 PM Anne Fung, JANA [facility] Physical Therapy        Today's Diagnoses     Left knee pain        Right knee pain           Follow-ups after your visit        Your next 10 appointments already scheduled     May 08, 2018  3:40 PM CDT   [facility] Extremity with Anne Fung PT   [facility] ([facility])    [address]  [facility] [address] [phone]              Who to contact     If you have questions or need follow up information about today's clinic visit or your schedule please contact [facility]ICAL [facility] directly at [phone].  Normal or non-critical lab and imaging results will be communicated to you by [facility] [employer] [facility], letter or phone within 4 business days after the clinic has received the results. If you do not hear from us within 7 days, please contact the clinic through [employer] [employer] [facility] or phone. If you have a critical or abnormal lab result, we will notify you by phone as soon as possible.  Submit refill requests through [email or web address] or call your pharmacy and they will forward the refill request to us. Please allow 3 business days for your refill to be completed.          Additional Information About Your Visit        MyChart Information     [email or web address] lets you send messages to your doctor, view your test results, renew your prescriptions, schedule appointments and more. To sign up, go to [email or web address] . Click on \"Log in\" on the left side of the screen, which will take you to the Welcome page. Then click on \"Sign up Now\" on the right side of the page.     You will be asked to enter the access code listed below, as well as some personal " information. Please follow the directions to create your username and password.     Your access code is: [email or web address]  Expires: 2018  9:44 AM     Your access code will  in 90 days. If you need help or a new code, please call your [facility] or [phone].        Care EveryWhere ID     This is your Care EveryWhere ID. This could be used by other organizations to access your [facility] medical records  [id number]         Blood Pressure from Last 3 Encounters:   18 116/66   02/05/15 106/68   14 122/78    Weight from Last 3 Encounters:   18 80.5 kg (177 lb 8 oz)   02/05/15 80.3 kg (177 lb)   14 81.5 kg (179 lb 9.6 oz)              We Performed the Following     Therapeutic Activities     Therapeutic Exercises        Primary Care Provider Fax #    Physician No Ref-Primary [phone]       No address on file        Equal Access to Services     VERONICA VELAZQUEZ : Hadii aad ku hadasho Soalmaz, waaxda luqadaha, qaybta kaalmada adeegyasylvain, shivani felix . So [facility] [phone].    ATENCIÓN: Si ghulamla espmatthieu, tiene a pemberton disposición servicios gratuitos de asistencia lingüística. Llame al [phone].    We comply with applicable federal civil rights laws and [place] laws. We do not discriminate on the basis of race, color, national origin, age, disability, sex, sexual orientation, or gender identity.            Thank you!     Thank you for choosing [facility] PHYSICAL THER[facility]  for your care. Our goal is always to provide you with excellent care. Hearing back from our patients is one way we can continue to improve our services. Please take a few minutes to complete the written survey that you may receive in the mail after your visit with us. Thank you!             Your Updated Medication List - Protect others around you: Learn how to safely use, store and throw away your medicines at www.disposemymeds.org.          This list is  accurate as of 4/10/18  5:50 PM.  Always use your most recent med list.                   Brand Name Dispense Instructions for use Diagnosis    amLODIPine 5 MG tablet    NORVASC     Take 5 mg by mouth daily

## 2018-08-02 PROBLEM — M25.561 RIGHT KNEE PAIN: Status: RESOLVED | Noted: 2018-03-19 | Resolved: 2018-08-02

## 2018-08-02 PROBLEM — M25.562 LEFT KNEE PAIN: Status: RESOLVED | Noted: 2018-03-19 | Resolved: 2018-08-02

## 2018-08-02 NOTE — PROGRESS NOTES
Subjective:  HPI                    Objective:  System    Physical Exam    General     ROS    Assessment/Plan:    DISCHARGE REPORT    Progress reporting period is from 3-19-18 to 4-10-18.     SUBJECTIVE      Current Pain level: 2/10            ;   ,     Patient has failed to return to therapy so current objective findings are unknown.  The subjective and objective information are from the last SOAP note on this patient.    OBJECTIVE         ASSESSMENT/PLAN  Updated problem list and treatment plan: Diagnosis 1:  Knee pain-probably PFP  Pain -  self management, education, directional preference exercise and home program  Decreased strength - therapeutic exercise, therapeutic activities and home program  Impaired balance - neuro re-education, therapeutic activities and home program  Decreased proprioception - neuro re-education, therapeutic activities and home program  Impaired muscle performance - neuro re-education and home program  Decreased function - therapeutic activities and home program  STG/LTGs have been met or progress has been made towards goals:  Yes (See Goal flow sheet completed today.)  Assessment of Progress: The patient's condition has potential to improve.  The patient has not returned to therapy. Current status is unknown.  Self Management Plans:  Patient has been instructed in a home treatment program.  Patient  has been instructed in self management of symptoms.    Adrian continues to require the following intervention to meet STG and LTG's:   The patient failed to complete scheduled/ordered appointments so current information is unknown.  We will discharge this patient from PT.    Recommendations:      Please refer to the daily flowsheet for treatment today, total treatment time and time spent performing 1:1 timed codes.
